# Patient Record
Sex: FEMALE | Race: BLACK OR AFRICAN AMERICAN | NOT HISPANIC OR LATINO | Employment: UNEMPLOYED | URBAN - NONMETROPOLITAN AREA
[De-identification: names, ages, dates, MRNs, and addresses within clinical notes are randomized per-mention and may not be internally consistent; named-entity substitution may affect disease eponyms.]

---

## 2018-05-24 ENCOUNTER — TRANSCRIBE ORDERS (OUTPATIENT)
Dept: PHYSICAL THERAPY | Facility: HOSPITAL | Age: 48
End: 2018-05-24

## 2018-05-24 DIAGNOSIS — M17.11 OSTEOARTHRITIS OF RIGHT KNEE, UNSPECIFIED OSTEOARTHRITIS TYPE: ICD-10-CM

## 2018-05-24 DIAGNOSIS — M77.12 LEFT TENNIS ELBOW: ICD-10-CM

## 2018-06-01 ENCOUNTER — HOSPITAL ENCOUNTER (OUTPATIENT)
Dept: PHYSICAL THERAPY | Facility: HOSPITAL | Age: 48
Setting detail: THERAPIES SERIES
Discharge: HOME | End: 2018-06-01

## 2018-06-01 DIAGNOSIS — M77.12 LEFT LATERAL EPICONDYLITIS: Primary | ICD-10-CM

## 2018-06-01 DIAGNOSIS — M17.11 PRIMARY OSTEOARTHRITIS OF RIGHT KNEE: ICD-10-CM

## 2018-06-01 PROCEDURE — 97161 PT EVAL LOW COMPLEX 20 MIN: CPT | Performed by: PHYSICAL THERAPIST

## 2018-06-01 NOTE — THERAPY EVALUATION
Outpatient Physical Therapy Ortho Initial Evaluation  [facility]     Patient Name: Maurisio Cosme  : 1970  MRN: [id number]  Today's Date: 2018      Visit Date: 2018  Visit   Return to MD: BASSEM  Re-cert date: 18  There is no problem list on file for this patient.       Past Medical History:   Diagnosis Date   • Hypertension         Past Surgical History:   Procedure Laterality Date   • COLONOSCOPY     • TONSILLECTOMY         Visit Dx:     ICD-10-CM ICD-9-CM   1. Left lateral epicondylitis M77.12 726.32   2. Primary osteoarthritis of right knee M17.11 715.16     Meds: Chlorthiadone  Allergies: NKA            PT Ortho     Row Name 18 0800       Subjective Comments    Subjective Comments 46 yo female with left elbow pain over the past 9 months ago, insidious onset, pt attributes to overuse injury. Worsening left elbow symptoms since onset. Onset of right knee pain for 5+ years. Has had multiple injections over the use to manage her symptoms. Diagnosed with mild osteoarthritis with the right. Aggravating factors: squatting and rising from sitting (R knee); performing daily tasks with hand downward and picking up items.   -BS       Precautions and Contraindications    Precautions/Limitations no known precautions/limitations  -BS       Subjective Pain    Able to rate subjective pain? yes  -BS    Pre-Treatment Pain Level 0  -BS    Post-Treatment Pain Level 0  -BS    Subjective Pain Comment pt with 7/10 intermittent R knee and 10/10 left elbow pain   -BS       Special Tests/Palpation    Special Tests/Palpation Knee;Elbow/Forearm  -BS       Knee Special Tests    Anterior drawer (ACL lesion) Right:;Negative  -BS    Posterior drawer (PCL lesion) Right:;Negative  -BS    Valgus stress (MCL lesion) Right:;Negative  -BS    Varus stress (LCL lesion) Right:;Negative  -BS    Thessaly test (meniscal lesion) Right:;Negative  -BS    Patellar grind test (chondromalacia patella)  Right:;Positive  -BS    Knee Special Tests Comments MMT: R LE-5/5 except 4+/5 R hip flex and 4+/5 R knee ext; LLE-5/5 R UE-5/5 (all planes) L UE-5/5 (all planes including elbow and wrist) except 4/5 L wrist extension (painful with resistance)   -BS       General ROM    GENERAL ROM COMMENTS AROM: R knee 0-137° L knee 0-147° L elbow: 0-140° R elbow 0-137° L wrist: flex 67° ext 76° ulnar deviation 33° radial deviation 25° forearm supination 85° pronation 80°  -BS      User Key  (r) = Recorded By, (t) = Taken By, (c) = Cosigned By    Initials Name Provider Type    BS Pardeep Francisco, PT Physical Therapist                      Therapy Education  Education Details: left wrist/elbow stretch  Given: HEP (demonstration only)  Program: New  How Provided: Verbal  Provided to: Patient  Level of Understanding: Verbalized           PT OP Goals     Row Name 06/01/18 0800          PT Short Term Goals    STG Date to Achieve 06/15/18  -BS     STG 1 Patient indep with HEP  -BS     STG 1 Progress New  -BS     STG 2 Improve left wrist flex and ext AROM to 80°  -BS     STG 2 Progress New  -BS     STG 3 Improve left wrist ext MMT to 5/5 (painless)  -BS     STG 3 Progress New  -BS     STG 4 Improve right knee ext MMT to 5/5  -BS     STG 4 Progress Comments --  -BS     STG 5 Able to perform a partial squat with minimal 3/10 R knee pain  -BS     STG 5 Progress New  -BS     STG 6 Able to  light to medium weight items with the left hand from a pronated position with moderate 4-5/10 L elbow pain   -BS     STG 6 Progress New  -BS        Long Term Goals    LTG Date to Achieve 06/29/18  -BS     LTG 1 Able to perform a deep squat with minimal 1/10 R knee pain  -BS     LTG 1 Progress New  -BS     LTG 2 Able to  medium to heavy weight items with the left hand from a pronated position with minimal 2/10 L elbow pain   -BS     LTG 2 Progress New  -BS        Time Calculation    PT Goal Re-Cert Due Date 06/22/18  -BS       User Key  (r) =  Recorded By, (t) = Taken By, (c) = Cosigned By    Initials Name Provider Type    BRANDON Francisco, PT Physical Therapist                PT Assessment/Plan     Row Name 06/01/18 1100          PT Assessment    Functional Limitations Performance in work activities;Performance in sport activities;Performance in self-care ADL;Performance in leisure activities;Limitation in home management  -BS     Impairments Range of motion;Posture;Pain;Muscle strength  -BS     Assessment Comments left wrist pain due to lateral epicondylitis and right knee pain due to mild OA.  -BS     Please refer to paper survey for additional self-reported information Yes  -BS     Rehab Potential Fair  -BS     Patient/caregiver participated in establishment of treatment plan and goals Yes  -BS     Patient would benefit from skilled therapy intervention Yes  -BS        PT Plan    PT Frequency 2x/week  -BS     Predicted Duration of Therapy Intervention (OT Eval) 4-6 weeks  -BS     Planned CPT's? PT EVAL LOW COMPLEXITY: 70892;PT RE-EVAL: 12171;PT THER PROC EA 15 MIN: 40674;PT MANUAL THERAPY EA 15 MIN: 88937;PT NEUROMUSC RE-EDUCATION EA 15 MIN: 76869;PT GAIT TRAINING EA 15 MIN: 82912;PT ELECTRICAL STIM UNATTEND: ;PT ULTRASOUND EA 15 MIN: 02915;PT HOT/COLD PACK WC NONMCARE: 95359;PT THER SUPP EA 15 MIN  -BS     Physical Therapy Interventions (Optional Details) balance training;gait training;home exercise program;joint mobilization;manual therapy techniques;modalities;neuromuscular re-education;patient/family education;postural re-education;ROM (Range of Motion);stair training;strengthening;stretching  -BS     PT Plan Comments f/u with knee stability ex's, left wrist flex/ext stretches and ice/estim to reduce tennis elbow inflammation.  -BS       User Key  (r) = Recorded By, (t) = Taken By, (c) = Cosigned By    Initials Name Provider Type    BRANDON Francisco PT Physical Therapist                  Exercises     Row Name 06/01/18 0800              Subjective Comments    Subjective Comments 48 yo female with left elbow pain over the past 9 months ago, insidious onset, pt attributes to overuse injury. Worsening left elbow symptoms since onset. Onset of right knee pain for 5+ years. Has had multiple injections over the use to manage her symptoms. Diagnosed with mild osteoarthritis with the right. Aggravating factors: squatting and rising from sitting (R knee); performing daily tasks with hand downward and picking up items.   -BS         Subjective Pain    Able to rate subjective pain? yes  -BS      Pre-Treatment Pain Level 0  -BS      Post-Treatment Pain Level 0  -BS      Subjective Pain Comment pt with 7/10 intermittent R knee and 10/10 left elbow pain   -BS         Exercise 1    Exercise Name 1 L wrist flex/ext AROM  -BS      Additional Comments demonstration only  -BS        User Key  (r) = Recorded By, (t) = Taken By, (c) = Cosigned By    Initials Name Provider Type    BRANDON Francisco, PT Physical Therapist                        Outcome Measure Options: Lower Extremity Functional Scale (LEFS), Quick DASH  Quick DASH  Open a tight or new jar.: Unable  Do heavy household chores (e.g., wash walls, wash floors): Moderate Difficulty  Carry a shopping bag or briefcase: Severe Difficulty  Wash your back: Severe Difficulty  Use a knife to cut food: Severe Difficulty  Recreational activities in which you take some force or impact through your arm, should or hand (e.g. golf, hammering, tennis, etc.): Severe Difficulty  During the past week, to what extent has your arm, shoulder, or hand problem interfered with your normal social activites with family, friends, neighbors or groups?: Extremely  During the past week, were you limited in your work or other regular daily activities as a result of your arm, shoulder or hand problem?: Slightly Limited  Arm, Shoulder, or hand pain: Severe  Tingling (pins and needles) in your arm, shoulder, or hand: None  During the past  week, how much difficulty have you had sleeping because of the pain in your arm, shoulder or hand?: Severe Difficulty  Number of Questions Answered: 11  Quick DASH Score: 65.91  Lower Extremity Functional Index  Any of your usual work, housework or school activities: A little bit of difficulty  Your usual hobbies, recreational or sporting activities: Quite a bit of difficulty  Getting into or out of the bath: Quite a bit of difficulty  Walking between rooms: No difficulty  Putting on your shoes or socks: No difficulty  Squatting: Extreme difficulty or unable to perform activity  Lifting an object, like a bag of groceries from the floor: No difficulty  Performing light activities around your home: A little bit of difficulty  Performing heavy activities around your home: Quite a bit of difficulty  Getting into or out of a car: Quite a bit of difficulty  Walking 2 blocks: No difficulty  Walking a mile: A little bit of difficulty  Going up or down 10 stairs (about 1 flight of stairs): Moderate difficulty  Standing for 1 hour: A little bit of difficulty  Sitting for 1 hour: No difficulty  Running on even ground: Extreme difficulty or unable to perform activity  Running on uneven ground: Extreme difficulty or unable to perform activity  Making sharp turns while running fast: Quite a bit of difficulty  Hopping: Extreme difficulty or unable to perform activity  Rolling over in bed: Moderate difficulty  Total: 41      Time Calculation:   Start Time: 0807  Stop Time: 0852  Time Calculation (min): 45 min     Therapy Charges for Today     Code Description Service Date Service Provider Modifiers Qty    95482151719 HC PT EVAL LOW COMPLEXITY 3 6/1/2018 Pardeep Francisco, PT GP 1          PT G-Codes  Outcome Measure Options: Lower Extremity Functional Scale (LEFS), Quick DASH         Pardeep Francisco, PT  6/1/2018

## 2018-06-05 ENCOUNTER — HOSPITAL ENCOUNTER (OUTPATIENT)
Dept: PHYSICAL THERAPY | Facility: HOSPITAL | Age: 48
Setting detail: THERAPIES SERIES
Discharge: HOME | End: 2018-06-05

## 2018-06-05 DIAGNOSIS — M77.12 LEFT LATERAL EPICONDYLITIS: Primary | ICD-10-CM

## 2018-06-05 DIAGNOSIS — M17.11 PRIMARY OSTEOARTHRITIS OF RIGHT KNEE: ICD-10-CM

## 2018-06-05 PROCEDURE — 97110 THERAPEUTIC EXERCISES: CPT

## 2018-06-05 NOTE — THERAPY TREATMENT NOTE
Outpatient Physical Therapy Ortho Treatment Note  [facility]     Patient Name: Maurisio Cosme  : 1970  MRN: [id number]  Today's Date: 2018      Visit Date: 2018       Subjective Improvement: n/a  Attendance:    Next MD Visit : BASSEM  Recert Date:  18      Therapy Diagnosis:  Left lateral epicondylitis         Visit Dx:    ICD-10-CM ICD-9-CM   1. Left lateral epicondylitis M77.12 726.32   2. Primary osteoarthritis of right knee M17.11 715.16       There is no problem list on file for this patient.       Past Medical History:   Diagnosis Date   • Hypertension         Past Surgical History:   Procedure Laterality Date   • COLONOSCOPY     • TONSILLECTOMY               PT Ortho     Row Name 18 09       Subjective Pain    Post-Treatment Pain Level 2  -    Subjective Pain Comment only in the elbow  -      User Key  (r) = Recorded By, (t) = Taken By, (c) = Cosigned By    Initials Name Provider Type     Adamaris Roman PTA Physical Therapy Assistant                            PT Assessment/Plan     Row Name 18 09          PT Assessment    Assessment Comments Pt verbalizes that pro ll bike created some discomfort in the L elbow so she had to adjust her  in order to limit the discomfort. R knee pain wasn't present during PT today. Sent home w HEP 2x/day   -        PT Plan    PT Frequency 2x/week  -     Predicted Duration of Therapy Intervention (OT Eval) 4-6 weeks  -     PT Plan Comments Cont w knee strengthening and stabilazation ther ex. L elbow is the only thing bothering pt at this point so focus on stretching w this.   -       User Key  (r) = Recorded By, (t) = Taken By, (c) = Cosigned By    Initials Name Provider Type     Adamaris Roman PTA Physical Therapy Assistant                    Exercises     Row Name 18 0900             Subjective Comments    Subjective Comments Pt presents to PT this morning w no reports of pain and  "states that she is \"feeling better.\"  -         Subjective Pain    Able to rate subjective pain? yes  -      Pre-Treatment Pain Level 0  -      Post-Treatment Pain Level 2  -      Subjective Pain Comment only in the elbow  -HH         Exercise 1    Exercise Name 1 Pro ll UE/LE strengthening   -      Time 1 10 mins  -HH      Additional Comments level 2.5  -HH         Exercise 2    Exercise Name 2 Incline stretch  -      Sets 2 3  -HH      Time 2 30 secs  -HH      Additional Comments R knee  -HH         Exercise 3    Exercise Name 3 HS stretch  -HH      Sets 3 3  -HH      Time 3 30 secs  -HH         Exercise 4    Exercise Name 4 seated knee flex/ext  -      Sets 4 2  -HH      Reps 4 10  -HH      Additional Comments  R knee- green tband  -         Exercise 5    Exercise Name 5 knee abd w green tband  -      Sets 5 2  -HH      Reps 5 10  -HH      Time 5 --  -         Exercise 6    Exercise Name 6 wrist flex/ext  -      Sets 6 1  -HH      Reps 6 3  -HH      Time 6 30 secs  -HH      Additional Comments --  -         Exercise 7    Exercise Name 7 wrist pronation suponation  -      Sets 7 2  -HH      Reps 7 10  -HH      Additional Comments 5 secs  -HH         Exercise 8    Exercise Name 8  strength - yellow thera roll  -      Sets 8 2  -HH      Reps 8 10  -HH      Additional Comments 5 sec hold  -HH        User Key  (r) = Recorded By, (t) = Taken By, (c) = Cosigned By    Initials Name Provider Type     Adamaris Roman, PTA Physical Therapy Assistant                               PT OP Goals     Row Name 06/05/18 0900          PT Short Term Goals    STG Date to Achieve 06/15/18  -     STG 1 Patient indep with HEP  -     STG 1 Progress New  -     STG 2 Improve left wrist flex and ext AROM to 80°  -     STG 2 Progress New  -     STG 3 Improve left wrist ext MMT to 5/5 (painless)  -     STG 3 Progress New  -     STG 4 Improve right knee ext MMT to 5/5  -     STG 5 Able to " perform a partial squat with minimal 3/10 R knee pain  -     STG 5 Progress New  [facility]     STG 6 Able to  light to medium weight items with the left hand from a pronated position with moderate 4-5/10 L elbow pain   -     STG 6 Progress [facility]        Long Term Goals    LTG Date to Achieve 06/29/18  -     LTG 1 Able to perform a deep squat with minimal 1/10 R knee pain  -     LTG 1 Progress [facility]     LTG 2 Able to  medium to heavy weight items with the left hand from a pronated position with minimal 2/10 L elbow pain   -     LTG 2 Progress [facility]        Time Calculation    PT Goal Re-Cert Due Date 06/22/18  -       User Key  (r) = Recorded By, (t) = Taken By, (c) = Cosigned By    Initials Name Provider Type     Adamaris Roman PTA Physical Therapy Assistant                         Time Calculation:   Start Time: 0907  Stop Time: 1000  Time Calculation (min): 53 min  Total Timed Code Minutes- PT: 53 minute(s)    Therapy Charges for Today     Code Description Service Date Service Provider Modifiers Qty    18946230291 HC PT THER PROC EA 15 MIN 6/5/2018 Adamaris Roman PTA GP 4                    Adamaris Roman PTA  6/5/2018

## 2018-06-12 ENCOUNTER — HOSPITAL ENCOUNTER (OUTPATIENT)
Dept: PHYSICAL THERAPY | Facility: HOSPITAL | Age: 48
Setting detail: THERAPIES SERIES
Discharge: HOME | End: 2018-06-12

## 2018-06-12 DIAGNOSIS — M17.11 PRIMARY OSTEOARTHRITIS OF RIGHT KNEE: ICD-10-CM

## 2018-06-12 DIAGNOSIS — M77.12 LEFT LATERAL EPICONDYLITIS: Primary | ICD-10-CM

## 2018-06-12 PROCEDURE — 97110 THERAPEUTIC EXERCISES: CPT

## 2018-06-12 NOTE — THERAPY TREATMENT NOTE
Outpatient Physical Therapy Ortho Treatment Note  [facility]  Brisa Emery ATC       Patient Name: Maurisio Cosme  : 1970  MRN: [id number]  Today's Date: 2018      Visit Date: 2018  Pt reports 1/10 pain pre treatment, 0/10 pain post treatment  Reports 0% of improvement.  Attended 3/3 visits.  Insurance available: 60  Next MD appt: [id number].  Recertification: 2018.  Visit Dx:    ICD-10-CM ICD-9-CM   1. Left lateral epicondylitis M77.12 726.32   2. Primary osteoarthritis of right knee M17.11 715.16       There is no problem list on file for this patient.       Past Medical History:   Diagnosis Date   • Hypertension         Past Surgical History:   Procedure Laterality Date   • COLONOSCOPY     • TONSILLECTOMY               PT Ortho     Row Name 18       Subjective Comments    Subjective Comments (P)  states that she really doesnt have any pain just discomfort this date  -HB       Subjective Pain    Able to rate subjective pain? (P)  yes  -HB    Pre-Treatment Pain Level (P)  1  -HB      User Key  (r) = Recorded By, (t) = Taken By, (c) = Cosigned By    Initials Name Provider Type     Brisa Emery ATC                             PT Assessment/Plan     Row Name 18          PT Assessment    Assessment Comments (P)  no adverse skin reactions post soft tissue work. encouraged to ice and to stay hydrated after treatment. instructed that she may be sore tomorrow after tissue stress risors were worked on   -HB        PT Plan    PT Frequency (P)  2x/week  -HB     PT Plan Comments (P)  cont and recheck forearm. progress knee strength   -HB       User Key  (r) = Recorded By, (t) = Taken By, (c) = Cosigned By    Initials Name Provider Type     Brisa Emery ATC                 Modalities     Row Name 18             Ice    Ice Applied (P)  Yes  -HB      Location (P)  elbow  -HB      Rx Minutes (P)  15 mins  -HB       "Ice S/P Rx (P)  Yes  -HB        User Key  (r) = Recorded By, (t) = Taken By, (c) = Cosigned By    Initials Name Provider Type    HB Brisa Emery, ATC                 Exercises     Row Name 06/12/18 0900             Subjective Comments    Subjective Comments (P)  states that she really doesnt have any pain just discomfort this date  -HB         Subjective Pain    Able to rate subjective pain? (P)  yes  -HB      Pre-Treatment Pain Level (P)  1  -HB         Exercise 1    Exercise Name 1 (P)  Pro ll UE/LE  -HB      Time 1 (P)  10 minutes  -HB         Exercise 2    Exercise Name 2 (P)  Incline stretch  -HB      Sets 2 (P)  2  -HB      Time 2 (P)  30 secs  -HB         Exercise 3    Exercise Name 3 (P)  HS stretch  -HB      Sets 3 (P)  2  -HB      Time 3 (P)  30 secs  -HB         Exercise 4    Exercise Name 4 (P)  CT1 and CT2  -HB      Reps 4 (P)  2  -HB      Time 4 (P)  1 minute  -HB         Exercise 5    Exercise Name 5 (P)  Soft trigger point release to lateral elbow/forearms  -HB      Time 5 (P)  5 minutes  -HB         Exercise 6    Exercise Name 6 (P)  power web spreads  -HB      Reps 6 (P)  20  -HB      Additional Comments (P)  green short and wide position  -HB         Exercise 7    Exercise Name 7 (P)  power web    -HB      Reps 7 (P)  20  -HB      Additional Comments (P)  green   -HB         Exercise 8    Exercise Name 8 (P)  wrist roller h   -HB      Reps 8 (P)  20  -HB      Additional Comments (P)  yellow  -HB         Exercise 9    Exercise Name 9 (P)  shuttle 2L   -HB      Reps 9 (P)  15  -HB      Additional Comments (P)  6 cords  -HB         Exercise 10    Exercise Name 10 (P)  shuttle 2L with add squeeze  -HB      Reps 10 (P)  15  -HB      Additional Comments (P)  6 cords  -HB         Exercise 11    Exercise Name 11 (P)  steps ups  -HB      Sets 11 (P)  2  -HB      Reps 11 (P)  10  -HB      Additional Comments (P)  4\"  -HB         Exercise 12    Exercise Name 12 (P)  seated ham curls with " TB  -HB      Reps 12 (P)  20  -HB      Additional Comments (P)  green  -HB        User Key  (r) = Recorded By, (t) = Taken By, (c) = Cosigned By    Initials Name Provider Type    MARCELLO Emery, ATC                         Manual Rx (last 36 hours)      Manual Treatments     Row Name 06/12/18 0900             Manual Rx 1    Manual Rx 1 Location (P)  soft tissue trigger point release to lateral elbow/foraearms  -HB      Manual Rx 1 Duration (P)  5 minutes  -HB        User Key  (r) = Recorded By, (t) = Taken By, (c) = Cosigned By    Initials Name Provider Type    MARCELLO Emery, ATC                 PT OP Goals     Row Name 06/12/18 0900          PT Short Term Goals    STG Date to Achieve (P)  06/15/18  -HB     STG 1 (P)  Patient indep with HEP  -HB     STG 1 Progress (P)  Ongoing  -HB     STG 2 (P)  Improve left wrist flex and ext AROM to 80°  -HB     STG 2 Progress (P)  Ongoing  -HB     STG 3 (P)  Improve left wrist ext MMT to 5/5 (painless)  -HB     STG 3 Progress (P)  Ongoing  -HB     STG 4 (P)  Improve right knee ext MMT to 5/5  -HB     STG 4 Progress (P)  Ongoing  -HB     STG 5 (P)  Able to perform a partial squat with minimal 3/10 R knee pain  -HB     STG 5 Progress (P)  Ongoing  -HB     STG 6 (P)  Able to  light to medium weight items with the left hand from a pronated position with moderate 4-5/10 L elbow pain   -HB     STG 6 Progress (P)  Ongoing  -HB        Long Term Goals    LTG Date to Achieve (P)  06/29/18  -HB     LTG 1 (P)  Able to perform a deep squat with minimal 1/10 R knee pain  -HB     LTG 1 Progress (P)  Ongoing  -HB     LTG 2 (P)  Able to  medium to heavy weight items with the left hand from a pronated position with minimal 2/10 L elbow pain   -HB     LTG 2 Progress (P)  Ongoing  -HB        Time Calculation    PT Goal Re-Cert Due Date (P)  06/22/18  -HB       User Key  (r) = Recorded By, (t) = Taken By, (c) = Cosigned By    Initials Name Provider  Type    HB Brsia Emery, ATC           Therapy Education  Given: (P) HEP  Program: (P) Reinforced  How Provided: (P) Verbal  Provided to: (P) Patient  Level of Understanding: (P) Verbalized              Time Calculation:   Start Time: (P) 0857  Stop Time: (P) 0955  Time Calculation (min): (P) 58 min  PT Non-Billable Time (min): (P) 15 min  Total Timed Code Minutes- PT: (P) 43 minute(s)  Therapy Suggested Charges     Code   Minutes Charges    None           Therapy Charges for Today     Code Description Service Date Service Provider Modifiers Qty    62627270277 HC PT THER PROC EA 15 MIN 6/12/2018 Brisa Emery, ATC  3    86714504110 HC PT THER SUPP EA 15 MIN 6/12/2018 Brisa Emery, ATC  1                    Brisa Emery, ATC  6/12/2018

## 2018-06-14 ENCOUNTER — HOSPITAL ENCOUNTER (OUTPATIENT)
Dept: PHYSICAL THERAPY | Facility: HOSPITAL | Age: 48
Setting detail: THERAPIES SERIES
Discharge: HOME | End: 2018-06-14

## 2018-06-14 DIAGNOSIS — M17.11 PRIMARY OSTEOARTHRITIS OF RIGHT KNEE: ICD-10-CM

## 2018-06-14 DIAGNOSIS — M77.12 LEFT LATERAL EPICONDYLITIS: Primary | ICD-10-CM

## 2018-06-14 PROCEDURE — 97110 THERAPEUTIC EXERCISES: CPT

## 2018-06-14 PROCEDURE — 97140 MANUAL THERAPY 1/> REGIONS: CPT

## 2018-06-14 NOTE — THERAPY TREATMENT NOTE
Outpatient Physical Therapy Ortho Treatment Note  [facility]     Patient Name: Maurisio Cosme  : 1970  MRN: [id number]  Today's Date: 2018      Visit Date: 2018  Pt reports 3/10 pain pre treatment, 2/10 pain post treatment  Reports helped some% of improvement.  Attended 4/4 visits.  Insurance available: 60  Next MD appt:BASSEM .  Recertification: 2018.  Visit Dx:    ICD-10-CM ICD-9-CM   1. Left lateral epicondylitis M77.12 726.32   2. Primary osteoarthritis of right knee M17.11 715.16       There is no problem list on file for this patient.       Past Medical History:   Diagnosis Date   • Hypertension         Past Surgical History:   Procedure Laterality Date   • COLONOSCOPY     • TONSILLECTOMY               PT Ortho     Row Name 18 0900       Subjective Comments    Subjective Comments pt stated that the left arm is better secondary to able to hold a water bottle in that hand  -TL       Subjective Pain    Able to rate subjective pain? yes  -TL    Pre-Treatment Pain Level 3  -TL    Post-Treatment Pain Level 2  -    Row Name 18 0900       Subjective Comments    Subjective Comments (P)  states that she really doesnt have any pain just discomfort this date  -       Subjective Pain    Able to rate subjective pain? (P)  yes  -HB    Pre-Treatment Pain Level (P)  1  -HB      User Key  (r) = Recorded By, (t) = Taken By, (c) = Cosigned By    Initials Name Provider Type    HB Brisa Emery, ATC     CAITLIN Draper, PTA Physical Therapy Assistant                            PT Assessment/Plan     Row Name 18 1100          PT Assessment    Assessment Comments No new goal met but progressing. pt now able to lift light objects. Pt tolerated putty ex and velco lg handle roll. No new c/o's. pt stated that the left elbow is feeling better.  -TL        PT Plan    PT Frequency 2x/week  -TL     PT Plan Comments measure ROM right knee and ROM left  wrist  -TL       User Key  (r) = Recorded By, (t) = Taken By, (c) = Cosigned By    Initials Name Provider Type    TL Esperanza J RODY Draper Physical Therapy Assistant                Modalities     Row Name 06/14/18 0900             Ice    Ice Applied Yes  -TL      Location elbow  -TL      Rx Minutes 15 mins  -TL      Ice S/P Rx Yes  -TL        User Key  (r) = Recorded By, (t) = Taken By, (c) = Cosigned By    Initials Name Provider Type    TL Esperanza CASTRO RODY Draper Physical Therapy Assistant                Exercises     Row Name 06/14/18 0900             Subjective Comments    Subjective Comments pt stated that the left arm is better secondary to able to hold a water bottle in that hand  -TL         Subjective Pain    Able to rate subjective pain? yes  -TL      Pre-Treatment Pain Level 3  -TL      Post-Treatment Pain Level 2  -TL         Exercise 1    Exercise Name 1 Pro ll UE/LE  -TL      Time 1 10 minutes  -TL      Additional Comments level 3  -TL         Exercise 2    Exercise Name 2 Incline stretch  -TL      Sets 2 2  -TL      Time 2 30 sec hold  -TL         Exercise 3    Exercise Name 3 [place]  -TL      Sets 3 2  -TL      Time 3 30 sec hold  -TL         Exercise 4    Exercise Name 4 CT1 and CT2  -TL      Reps 4 2  -TL      Time 4 1 min  -TL         Exercise 5    Exercise Name 5 Power web spread ext  -TL      Reps 5 20  -TL      Additional Comments Green  -TL         Exercise 6    Exercise Name 6 power web flexion  -TL      Reps 6 20  -TL      Additional Comments green  -TL         Exercise 7    Exercise Name 7 checked  strength  -TL      Additional Comments 37,37,36  -TL         Exercise 8    Exercise Name 8 velco large roll  -TL      Time 8 3 mins  -TL         Exercise 9    Exercise Name 9 Putty red fisting  -TL      Time 9 3 mins  -TL         Exercise 10    Exercise Name 10 Putty spread fingers  -TL      Time 10 3 mins  -TL         Exercise 11    Exercise Name 11 See trigger point manual  -TL       Additional Comments 8 mins  -TL         Exercise 12    Exercise Name 12 HS pulls   -TL      Reps 12 20  -TL      Additional Comments GTB  -TL         Exercise 13    Exercise Name 13 Hip and knee flexion TB  -TL      Reps 13 20  -TL      Additional Comments GTB  -TL         Exercise 14    Exercise Name 14 MS  -TL      Reps 14 20  -TL        User Key  (r) = Recorded By, (t) = Taken By, (c) = Cosigned By    Initials Name Provider Type    TL Esperanza Draper PTA Physical Therapy Assistant                               PT OP Goals     Row Name 06/14/18 0900          PT Short Term Goals    STG Date to Achieve 06/15/18  -TL     STG 1 Patient indep with HEP  -TL     STG 1 Progress Ongoing;Progressing  -TL     STG 2 Improve left wrist flex and ext AROM to 80°  -TL     STG 2 Progress Ongoing;Progressing  -TL     STG 3 Improve left wrist ext MMT to 5/5 (painless)  -TL     STG 3 Progress Ongoing;Progressing  -TL     STG 4 Improve right knee ext MMT to 5/5  -TL     STG 4 Progress Ongoing;Progressing  -TL     STG 5 Able to perform a partial squat with minimal 3/10 R knee pain  -TL     STG 5 Progress Ongoing;Progressing  -TL     STG 6 Able to  light to medium weight items with the left hand from a pronated position with moderate 4-5/10 L elbow pain   -TL     STG 6 Progress Progressing   Pt able to  water bottle with left hand  -TL        Long Term Goals    LTG Date to Achieve 06/29/18  -TL     LTG 1 Able to perform a deep squat with minimal 1/10 R knee pain  -TL     LTG 1 Progress Ongoing  -TL     LTG 2 Able to  medium to heavy weight items with the left hand from a pronated position with minimal 2/10 L elbow pain   -TL     LTG 2 Progress Ongoing  -TL       User Key  (r) = Recorded By, (t) = Taken By, (c) = Cosigned By    Initials Name Provider Type    TL Esperanza Draper PTA Physical Therapy Assistant          Therapy Education  Education Details: Red putty #1 and 7 on putty sheet  Given: HEP  Program:  New  How Provided: Verbal, Demonstration, Written  Provided to: Patient  Level of Understanding: Verbalized              Time Calculation:   Start Time: 0903  Stop Time: 1007  Time Calculation (min): 64 min  PT Non-Billable Time (min): 15 min  Total Timed Code Minutes- PT: 49 minute(s)  Therapy Suggested Charges     Code   Minutes Charges    None           Therapy Charges for Today     Code Description Service Date Service Provider Modifiers Qty    39905786144 HC PT THER PROC EA 15 MIN 6/14/2018 Esperanza Draper PTA GP 2    61592060542 HC PT MANUAL THERAPY EA 15 MIN 6/14/2018 Esperanza Draper PTA GP 1    11411530190 HC PT THER SUPP EA 15 MIN 6/14/2018 Esperanza Draper PTA GP 1                    Esperanza Draper PTA  6/14/2018

## 2018-06-21 ENCOUNTER — TELEPHONE (OUTPATIENT)
Dept: PHYSICAL THERAPY | Facility: HOSPITAL | Age: 48
End: 2018-06-21

## 2018-09-10 ENCOUNTER — DOCUMENTATION (OUTPATIENT)
Dept: PHYSICAL THERAPY | Facility: HOSPITAL | Age: 48
End: 2018-09-10

## 2018-09-10 DIAGNOSIS — M17.11 PRIMARY OSTEOARTHRITIS OF RIGHT KNEE: ICD-10-CM

## 2018-09-10 DIAGNOSIS — M77.12 LEFT LATERAL EPICONDYLITIS: Primary | ICD-10-CM
